# Patient Record
Sex: MALE | Race: WHITE | Employment: FULL TIME | URBAN - METROPOLITAN AREA
[De-identification: names, ages, dates, MRNs, and addresses within clinical notes are randomized per-mention and may not be internally consistent; named-entity substitution may affect disease eponyms.]

---

## 2020-06-09 ENCOUNTER — ORDER TRANSCRIPTION (OUTPATIENT)
Dept: PHYSICAL THERAPY | Age: 55
End: 2020-06-09

## 2020-06-09 DIAGNOSIS — M25.522 LEFT ELBOW PAIN: Primary | ICD-10-CM

## 2020-06-10 ENCOUNTER — ORDER TRANSCRIPTION (OUTPATIENT)
Dept: PHYSICAL THERAPY | Age: 55
End: 2020-06-10

## 2020-06-10 ENCOUNTER — OFFICE VISIT (OUTPATIENT)
Dept: PHYSICAL THERAPY | Age: 55
End: 2020-06-10
Attending: FAMILY MEDICINE
Payer: OTHER MISCELLANEOUS

## 2020-06-10 DIAGNOSIS — M25.522 LEFT ELBOW PAIN: Primary | ICD-10-CM

## 2020-06-10 DIAGNOSIS — M25.522 LEFT ELBOW PAIN: ICD-10-CM

## 2020-06-10 PROCEDURE — 97110 THERAPEUTIC EXERCISES: CPT | Performed by: PHYSICAL THERAPIST

## 2020-06-10 PROCEDURE — 97162 PT EVAL MOD COMPLEX 30 MIN: CPT | Performed by: PHYSICAL THERAPIST

## 2020-06-10 NOTE — PROGRESS NOTES
UPPER EXTREMITY EVALUATION:   Referring Physician: Dr. Noel Holcomb  Diagnosis:  Left elbow pain (M25.522)     Date of Onset: 5/26/2020 Date of Service: 6/10/2020     PATIENT SUMMARY   Rosendo Luz is a 50year old y/o male who presents to therapy today with movements; avoidance or weight bearing on the (L) UE; (+) swelling around jointline; (-) bruising. Cervical Screen:  Limited CROM toward (R/L) rotation, sidebending, and extension with endrange ache/pain.      ROM/MMT:  Elbow   Flexion:  (L) 108 degs*/ 3 Neuromuscular Re-education; Electrical Stim; Patient education; Home exercise program instruction.     Education or treatment limitation: None  Rehab Potential: good    This evaluation involved Moderate Complexity decision making due to 1-2 personal factors

## 2020-06-11 ENCOUNTER — OFFICE VISIT (OUTPATIENT)
Dept: PHYSICAL THERAPY | Age: 55
End: 2020-06-11
Attending: FAMILY MEDICINE
Payer: OTHER MISCELLANEOUS

## 2020-06-11 PROCEDURE — 97110 THERAPEUTIC EXERCISES: CPT | Performed by: PHYSICAL THERAPIST

## 2020-06-11 NOTE — PROGRESS NOTES
Date: 6/11/2020     Dx:  Left elbow pain (M25.522)         Authorized # of Visits:  12       Referring MD: Afsaneh Crowder MD  Next MD visit: 3-6 weeks     Medication Changes since last visit?: No    Subjective:  Laura Bonilla report that his (L) elbow is still sore an elbow AROM in all directions with 4-5/5 MMT in all motions to promote all daily work, home, and recreational activities without discomfort or deviation.             Plan:   Re-assess next session and continue with directional preference exercise, eccentric

## 2020-06-18 ENCOUNTER — OFFICE VISIT (OUTPATIENT)
Dept: PHYSICAL THERAPY | Age: 55
End: 2020-06-18
Attending: FAMILY MEDICINE
Payer: OTHER MISCELLANEOUS

## 2020-06-18 PROCEDURE — 97110 THERAPEUTIC EXERCISES: CPT | Performed by: PHYSICAL THERAPIST

## 2020-06-18 NOTE — PROGRESS NOTES
Date: 6/11/2020     Dx:  Left elbow pain (M25.522)         Authorized # of Visits:  12       Referring MD: Lara Everett MD  Next MD visit: 3-6 weeks     Medication Changes since last visit?: No    Subjective:  Nathanael Bun report that his (L) elbow is still sore an abolished symptom in the (L) elbow to allow dressing up, reaching, lifting, carrying, driving, turning a knob to open a door, placing weight on the arm without producing or increasing symptoms in the (L) elbow and upper arm area.     3. Patient to have WNL

## 2020-06-19 ENCOUNTER — OFFICE VISIT (OUTPATIENT)
Dept: PHYSICAL THERAPY | Age: 55
End: 2020-06-19
Attending: FAMILY MEDICINE
Payer: OTHER MISCELLANEOUS

## 2020-06-19 PROCEDURE — 97110 THERAPEUTIC EXERCISES: CPT | Performed by: PHYSICAL THERAPIST

## 2020-06-19 NOTE — PROGRESS NOTES
Date: 6/19/2020     Dx:  Left elbow pain (M25.522)         Authorized # of Visits:  12       Referring MD: Oanh Wade., MD  Next MD visit: 3-6 weeks     Medication Changes since last visit?: No    Subjective:  Oliverio report that his (L) elbow is still sore fr 2 x 20 reps    Repeated (L) elbow extension with lateral OP x 10 reps             Assessment:   Patricia Escobar is responding to extension based directional preference exercise with a (L) lateral force.   Continued with eccentric strengthening with some production of

## 2020-06-24 ENCOUNTER — OFFICE VISIT (OUTPATIENT)
Dept: PHYSICAL THERAPY | Age: 55
End: 2020-06-24
Attending: FAMILY MEDICINE
Payer: OTHER MISCELLANEOUS

## 2020-06-24 PROCEDURE — 97110 THERAPEUTIC EXERCISES: CPT | Performed by: PHYSICAL THERAPIST

## 2020-06-24 NOTE — PROGRESS NOTES
Date: 6/24/2020     Dx:  Left elbow pain (M25.522)         Authorized # of Visits:  12       Referring MD: Nick Dial., MD  Next MD visit: 3-6 weeks     Medication Changes since last visit?: No    Subjective: Oliverio report that his (L) elbow is still sore. mins ea fwd/bkwd    Test motion: (L) elbow flexion; pain/tenderness medial elbow    Repeated (L) elbow flexion with supination x 10 reps; P, NW (no change)    Repeated (L) elbow flexion with pronation 2 x 10 reps; P, NW (better)     - no change     + self

## 2020-07-01 ENCOUNTER — TELEPHONE (OUTPATIENT)
Dept: PHYSICAL THERAPY | Age: 55
End: 2020-07-01

## 2020-07-02 ENCOUNTER — APPOINTMENT (OUTPATIENT)
Dept: PHYSICAL THERAPY | Age: 55
End: 2020-07-02
Attending: FAMILY MEDICINE
Payer: OTHER MISCELLANEOUS

## 2020-07-03 ENCOUNTER — OFFICE VISIT (OUTPATIENT)
Dept: PHYSICAL THERAPY | Age: 55
End: 2020-07-03
Attending: FAMILY MEDICINE
Payer: OTHER MISCELLANEOUS

## 2020-07-03 PROCEDURE — 97110 THERAPEUTIC EXERCISES: CPT | Performed by: PHYSICAL THERAPIST

## 2020-07-03 NOTE — PROGRESS NOTES
Date: 7/3/2020     Dx:  Left elbow pain (M25.522)         Authorized # of Visits:  12       Referring MD: Nafisa Hollingsworth., MD  Next MD visit: 3-6 weeks     Medication Changes since last visit?: No    Subjective:  Oliverio report that his (L) elbow is feeling better (L) tricep press blueTB 2 x 20 reps    (L) elbow flexion 10 lbs 2 x 20 reps    Repeated (L) elbow extension with lateral OP x 10 reps   Scifit UE only L4 x 3 mins ea fwd/bkwd    Test motion: (L) elbow flexion; pain/tenderness medial elbow    Repeated (L) e without producing or increasing symptoms in the (L) elbow and upper arm area.     3. Patient to have WNL of (L) elbow AROM in all directions with 4-5/5 MMT in all motions to promote all daily work, home, and recreational activities without discomfort or dev

## 2020-07-08 ENCOUNTER — OFFICE VISIT (OUTPATIENT)
Dept: PHYSICAL THERAPY | Age: 55
End: 2020-07-08
Attending: FAMILY MEDICINE
Payer: OTHER MISCELLANEOUS

## 2020-07-08 PROCEDURE — 97110 THERAPEUTIC EXERCISES: CPT | Performed by: PHYSICAL THERAPIST

## 2020-07-08 NOTE — PROGRESS NOTES
Date: 7/8/2020     Dx:  Left elbow pain (M25.522)         Authorized # of Visits:  12       Referring MD: Nick Dial., MD  Next MD visit: 3-6 weeks     Medication Changes since last visit?: No    Subjective:  Oliverio report that his (L) elbow is feeling better reps    (L) elbow flexion 10 lbs 2 x 20 reps    Repeated (L) elbow extension with lateral OP x 10 reps   Scifit UE only L4 x 3 mins ea fwd/bkwd    Test motion: (L) elbow flexion; pain/tenderness medial elbow    Repeated (L) elbow flexion with supination x a hammer/shana at home. Goals:   Goals     • Therapy Goals       (12 visits)  1. Patient to consistently perform their HEP and it's progression to maintain their improved condition.   2. Patient to have reduced an abolished symptom in the (L) elbow to a

## 2020-07-09 ENCOUNTER — APPOINTMENT (OUTPATIENT)
Dept: PHYSICAL THERAPY | Age: 55
End: 2020-07-09
Attending: FAMILY MEDICINE
Payer: OTHER MISCELLANEOUS

## 2020-07-16 ENCOUNTER — OFFICE VISIT (OUTPATIENT)
Dept: PHYSICAL THERAPY | Age: 55
End: 2020-07-16
Attending: FAMILY MEDICINE
Payer: OTHER MISCELLANEOUS

## 2020-07-16 PROCEDURE — 97110 THERAPEUTIC EXERCISES: CPT | Performed by: PHYSICAL THERAPIST

## 2020-07-16 NOTE — PROGRESS NOTES
Date: 7/16/2020     Dx:  Left elbow pain (M25.522)         Authorized # of Visits:  12       Referring MD: Ronn Lawrence MD  Next MD visit: 3-6 weeks     Medication Changes since last visit?: No    Subjective:  Kong Ghotra report that his (L) elbow hurt over the we lbs 2 x 10 reps x 10 eccen ct ea    OH (L) tricep press blueTB 2 x 20 reps    (L) elbow flexion 10 lbs 2 x 20 reps    Repeated (L) elbow extension with lateral OP x 10 reps   Scifit UE only L4 x 3 mins ea fwd/bkwd    Test motion: (L) elbow flexion; pain/te (better)     - less pain test motion 1/10    (L) Elbow in flexion eccentric supination with a cane with 3 lbs 10 reps x 10 eccen ct      - no change test motion    (L) Elbow supination with extension 2 x 10 reps; P, NW     - still 1/10 with more flexion mo

## 2020-07-17 ENCOUNTER — APPOINTMENT (OUTPATIENT)
Dept: PHYSICAL THERAPY | Age: 55
End: 2020-07-17
Attending: FAMILY MEDICINE
Payer: OTHER MISCELLANEOUS

## 2020-07-22 ENCOUNTER — OFFICE VISIT (OUTPATIENT)
Dept: PHYSICAL THERAPY | Age: 55
End: 2020-07-22
Attending: FAMILY MEDICINE
Payer: OTHER MISCELLANEOUS

## 2020-07-22 PROCEDURE — 97110 THERAPEUTIC EXERCISES: CPT | Performed by: PHYSICAL THERAPIST

## 2020-07-22 NOTE — PROGRESS NOTES
Date: 7/22/2020     Dx:  Left elbow pain (M25.522)         Authorized # of Visits:  12       Referring MD: Alexis Rose., MD  Next MD visit: 3-6 weeks     Medication Changes since last visit?: No    Subjective:  Caprice Iverson report that his (L) elbow feels better john reps x 10 eccen ct ea    OH (L) tricep press blueTB 2 x 20 reps    (L) elbow flexion 10 lbs 2 x 20 reps    Repeated (L) elbow extension with lateral OP x 10 reps   Scifit UE only L4 x 3 mins ea fwd/bkwd    Test motion: (L) elbow flexion; pain/tenderness me less pain test motion 1/10    (L) Elbow in flexion eccentric supination with a cane with 3 lbs 10 reps x 10 eccen ct      - no change test motion    (L) Elbow supination with extension 2 x 10 reps; P, NW     - still 1/10 with more flexion motion     + 10 m time.    Goals: - IN PROGRESS  Goals     • Therapy Goals       (12 visits)  1. Patient to consistently perform their HEP and it's progression to maintain their improved condition.   2. Patient to have reduced an abolished symptom in the (L) elbow to allow d

## 2020-07-23 ENCOUNTER — OFFICE VISIT (OUTPATIENT)
Dept: PHYSICAL THERAPY | Age: 55
End: 2020-07-23
Attending: FAMILY MEDICINE
Payer: OTHER MISCELLANEOUS

## 2020-07-23 PROCEDURE — 97110 THERAPEUTIC EXERCISES: CPT | Performed by: PHYSICAL THERAPIST

## 2020-07-23 NOTE — PROGRESS NOTES
Date: 7/23/2020     Dx:  Left elbow pain (M25.522)         Authorized # of Visits:  12       Referring MD: Ekta Henning., MD  Next MD visit: 2-3 weeks from (7/23/2020)     Medication Changes since last visit?: No    Subjective:  Angela Santana report that his (L) elbow cane and 2 lbs 2 x 10 reps x 10 eccen ct ea    OH (L) tricep press blueTB 2 x 20 reps    (L) elbow flexion 10 lbs 2 x 20 reps    Repeated (L) elbow extension with lateral OP x 10 reps   Scifit UE only L4 x 3 mins ea fwd/bkwd    Test motion: (L) elbow flexi P, NW (better)     - less pain test motion 1/10    (L) Elbow in flexion eccentric supination with a cane with 3 lbs 10 reps x 10 eccen ct      - no change test motion    (L) Elbow supination with extension 2 x 10 reps; P, NW     - still 1/10 with more flex remaining ache away in the (L) medial elbow. Progressed (L) UE stability and strengthening exercises with minimal ache produced during exercise but no worse as a result. He is well motivated, focused, and compliant with his HEP and it's progression.   He

## 2020-07-28 ENCOUNTER — ORDER TRANSCRIPTION (OUTPATIENT)
Dept: PHYSICAL THERAPY | Age: 55
End: 2020-07-28

## 2020-07-28 DIAGNOSIS — S53.402S ELBOW SPRAIN, LEFT, SEQUELA: Primary | ICD-10-CM

## 2020-07-30 ENCOUNTER — TELEPHONE (OUTPATIENT)
Dept: PHYSICAL THERAPY | Age: 55
End: 2020-07-30

## 2020-07-30 ENCOUNTER — OFFICE VISIT (OUTPATIENT)
Dept: PHYSICAL THERAPY | Age: 55
End: 2020-07-30
Attending: FAMILY MEDICINE
Payer: OTHER MISCELLANEOUS

## 2020-07-30 PROCEDURE — 97110 THERAPEUTIC EXERCISES: CPT | Performed by: PHYSICAL THERAPIST

## 2020-07-30 NOTE — PROGRESS NOTES
Date: 7/30/2020     Dx:  Left elbow pain (M25.522)         Authorized # of Visits:  12       Referring MD: Dianne Brittle., MD  Next MD visit: 2-3 weeks from (7/23/2020)     Medication Changes since last visit?: No    Subjective:  Andrae Lee report that his (L) elbow reps    (L) elbow flexion 10 lbs 2 x 20 reps    Repeated (L) elbow extension with lateral OP x 10 reps   Scifit UE only L4 x 3 mins ea fwd/bkwd    Test motion: (L) elbow flexion; pain/tenderness medial elbow    Repeated (L) elbow flexion with supination x supination with a cane with 3 lbs 10 reps x 10 eccen ct      - no change test motion    (L) Elbow supination with extension 2 x 10 reps; P, NW     - still 1/10 with more flexion motion     + 10 more (L) elbow supination with extension; P, NW (better)     - eccentric supination with cane and 3 lbs 2 x 10 reps x 10 eccen ct ea; NE     - no change test motion    (L) wrist roller with 3 lbs forward/backward roll x 10 reps ea; NE tired arm     - no change test motion    (B) UE OH wall walk with blueTB abd resist

## 2020-07-31 ENCOUNTER — APPOINTMENT (OUTPATIENT)
Dept: PHYSICAL THERAPY | Age: 55
End: 2020-07-31
Attending: FAMILY MEDICINE
Payer: OTHER MISCELLANEOUS

## 2020-08-13 ENCOUNTER — OFFICE VISIT (OUTPATIENT)
Dept: PHYSICAL THERAPY | Age: 55
End: 2020-08-13
Attending: FAMILY MEDICINE
Payer: OTHER MISCELLANEOUS

## 2020-08-13 PROCEDURE — 97110 THERAPEUTIC EXERCISES: CPT | Performed by: PHYSICAL THERAPIST

## 2020-08-13 NOTE — PROGRESS NOTES
Date: 8/13/2020     Dx:  Left elbow pain (M25.522)         Authorized # of Visits:  12       Referring MD: Ekta Henning., MD  Next MD visit: 2-3 weeks from (7/23/2020)     Medication Changes since last visit?: No    Subjective:  Angela Santana report that his (L) elbow mins ea fwd/bkwd    Test motion: (L) elbow flexion; pain/tenderness medial elbow    Repeated (L) elbow flexion with supination x 10 reps; P, NW (no change)    Repeated (L) elbow flexion with pronation 2 x 10 reps; P, NW (better)     - no change     + self 10 reps; P, NW     - still 1/10 with more flexion motion     + 10 more (L) elbow supination with extension; P, NW (better)     - no change in pain but feels easier to bend the elbow            Date: 7/22/2020  Tx#: 9/12 Date: 7/23/2020  Tx#: 10/12 Date: 7/ motion    (L) wrist roller with 3 lbs forward/backward roll x 10 reps ea; NE tired arm     - no change test motion    (B) UE OH wall walk with blueTB abd resist 10 reps x 10 cts ea; NE tired   Scifit UE only L9 x 5 mins ea fwd/bkwd     Ballistic (L) elbow all daily work, home, and recreational activities without discomfort or deviation. Plan:   Continue with 18 more PT sessions to progress through directional preference exercise, eccentric strengthening, stability exercises, and patient education.

## 2020-08-14 ENCOUNTER — OFFICE VISIT (OUTPATIENT)
Dept: PHYSICAL THERAPY | Age: 55
End: 2020-08-14
Attending: FAMILY MEDICINE
Payer: OTHER MISCELLANEOUS

## 2020-08-14 PROCEDURE — 97110 THERAPEUTIC EXERCISES: CPT | Performed by: PHYSICAL THERAPIST

## 2020-08-14 NOTE — PROGRESS NOTES
Date: 8/14/2020     Dx:  Left elbow pain (M25.522)         Authorized # of Visits:  12 + 18 = 30     Referring MD: Fabiano Malagon MD  Next MD visit: 2-3 weeks from (7/23/2020)     Medication Changes since last visit?: No    Subjective:  Heidy Souza report that his ( elbow flexion; pain/tenderness medial elbow    Repeated (L) elbow flexion with supination x 10 reps; P, NW (no change)    Repeated (L) elbow flexion with pronation 2 x 10 reps; P, NW (better)     - no change     + self OP 2 x 10 reps; P, NW (better)     + more flexion motion     + 10 more (L) elbow supination with extension; P, NW (better)     - no change in pain but feels easier to bend the elbow            Date: 7/22/2020  Tx#: 9/12 Date: 7/23/2020  Tx#: 10/12 Date: 7/30/2020  Tx#: 11/12 Date: 8/12/2020 wrist roller with 3 lbs forward/backward roll x 10 reps ea; NE tired arm     - no change test motion    (B) UE OH wall walk with blueTB abd resist 10 reps x 10 cts ea; NE tired   Scifit UE only L9 x 5 mins ea fwd/bkwd     Ballistic (L) elbow extension with reduced an abolished symptom in the (L) elbow to allow dressing up, reaching, lifting, carrying, driving, turning a knob to open a door, placing weight on the arm without producing or increasing symptoms in the (L) elbow and upper arm area.     3. Patient t

## 2020-08-20 ENCOUNTER — TELEPHONE (OUTPATIENT)
Dept: PHYSICAL THERAPY | Age: 55
End: 2020-08-20

## 2020-08-27 ENCOUNTER — APPOINTMENT (OUTPATIENT)
Dept: PHYSICAL THERAPY | Age: 55
End: 2020-08-27
Attending: FAMILY MEDICINE
Payer: OTHER MISCELLANEOUS

## 2020-08-28 ENCOUNTER — OFFICE VISIT (OUTPATIENT)
Dept: PHYSICAL THERAPY | Age: 55
End: 2020-08-28
Attending: FAMILY MEDICINE
Payer: OTHER MISCELLANEOUS

## 2020-08-28 PROCEDURE — 97110 THERAPEUTIC EXERCISES: CPT | Performed by: PHYSICAL THERAPIST

## 2020-08-28 NOTE — PROGRESS NOTES
Date: 8/14/2020     Dx:  Left elbow pain (M25.522)         Authorized # of Visits:  12 + 12 = 24    Referring MD: Nick Dial., MD  Next MD visit: 2-3 weeks from (7/23/2020)     Medication Changes since last visit?: No    Subjective:  Twin Boo report that his (L elbow flexion; pain/tenderness medial elbow    Repeated (L) elbow flexion with supination x 10 reps; P, NW (no change)    Repeated (L) elbow flexion with pronation 2 x 10 reps; P, NW (better)     - no change     + self OP 2 x 10 reps; P, NW (better)     + more flexion motion     + 10 more (L) elbow supination with extension; P, NW (better)     - no change in pain but feels easier to bend the elbow            Date: 7/22/2020  Tx#: 9/12 Date: 7/23/2020  Tx#: 10/12 Date: 7/30/2020  Tx#: 11/12 Date: 8/12/2020 wrist roller with 3 lbs forward/backward roll x 10 reps ea; NE tired arm     - no change test motion    (B) UE OH wall walk with blueTB abd resist 10 reps x 10 cts ea; NE tired   Scifit UE only L9 x 5 mins ea fwd/bkwd     Ballistic (L) elbow extension with kg ball catch/throw onto a rebounder x 25 reps; NE tired arm          Assessment: Kasey Huber did not feel any pain today but there was still an occasional \"cracking\"/\"popping\" in the (L) elbow joint.   He is slowly progressing with (L) UE strengthening and st

## 2020-09-02 ENCOUNTER — OFFICE VISIT (OUTPATIENT)
Dept: PHYSICAL THERAPY | Age: 55
End: 2020-09-02
Attending: FAMILY MEDICINE
Payer: OTHER MISCELLANEOUS

## 2020-09-02 PROCEDURE — 97110 THERAPEUTIC EXERCISES: CPT | Performed by: PHYSICAL THERAPIST

## 2020-09-02 NOTE — PROGRESS NOTES
Date: 8/14/2020     Dx:  Left elbow pain (M25.522)         Authorized # of Visits:  12 + 12 = 24    Referring MD: Anam Patel MD  Next MD visit: 2-3 weeks from (7/23/2020)     Medication Changes since last visit?: No    Subjective:  Florence Toscano report that his (L elbow flexion; pain/tenderness medial elbow    Repeated (L) elbow flexion with supination x 10 reps; P, NW (no change)    Repeated (L) elbow flexion with pronation 2 x 10 reps; P, NW (better)     - no change     + self OP 2 x 10 reps; P, NW (better)     + more flexion motion     + 10 more (L) elbow supination with extension; P, NW (better)     - no change in pain but feels easier to bend the elbow            Date: 7/22/2020  Tx#: 9/12 Date: 7/23/2020  Tx#: 10/12 Date: 7/30/2020  Tx#: 11/12 Date: 8/12/2020 wrist roller with 3 lbs forward/backward roll x 10 reps ea; NE tired arm     - no change test motion    (B) UE OH wall walk with blueTB abd resist 10 reps x 10 cts ea; NE tired   Scifit UE only L9 x 5 mins ea fwd/bkwd     Ballistic (L) elbow extension with x 10 reps ea    (L) UE 1 kg ball catch/throw onto a rebounder x 25 reps; NE tired arm Scifit UE only L9 x 5 mins ea fwd/bkwd     Ballistic (L) elbow extension with supination x 10 reps; NE    Seated: (L) elbow flexion concentric/eccentric with  double maxim

## 2020-09-10 ENCOUNTER — TELEPHONE (OUTPATIENT)
Dept: PHYSICAL THERAPY | Age: 55
End: 2020-09-10

## 2020-09-10 ENCOUNTER — APPOINTMENT (OUTPATIENT)
Dept: PHYSICAL THERAPY | Age: 55
End: 2020-09-10
Attending: FAMILY MEDICINE
Payer: OTHER MISCELLANEOUS

## 2020-09-10 NOTE — TELEPHONE ENCOUNTER
Patient did not show up for his appointment at 5:30pm.  Contacted patient and he says that he forgot he is still at work.   Reminded him of his next appointment tomorrow at 7 am.

## 2020-09-11 ENCOUNTER — OFFICE VISIT (OUTPATIENT)
Dept: PHYSICAL THERAPY | Age: 55
End: 2020-09-11
Attending: FAMILY MEDICINE
Payer: OTHER MISCELLANEOUS

## 2020-09-11 PROCEDURE — 97110 THERAPEUTIC EXERCISES: CPT | Performed by: PHYSICAL THERAPIST

## 2020-09-11 NOTE — PROGRESS NOTES
Date: 9/11/2020     Dx:  Left elbow pain (M25.522)         Authorized # of Visits:  12 + 12 = 24    Referring MD: Oanh Wade., MD  Next MD visit: 2-3 weeks from (7/23/2020)     Medication Changes since last visit?: No    Subjective:  Lennox Beady report that he eryn reps    Repeated (L) elbow extension with lateral OP x 10 reps   Scifit UE only L4 x 3 mins ea fwd/bkwd    Test motion: (L) elbow flexion; pain/tenderness medial elbow    Repeated (L) elbow flexion with supination x 10 reps; P, NW (no change)    Repeated ( reps x 10 eccen ct      - no change test motion    (L) Elbow supination with extension 2 x 10 reps; P, NW     - still 1/10 with more flexion motion     + 10 more (L) elbow supination with extension; P, NW (better)     - no change in pain but feels easier t rep    (L) Elbow eccentric supination with cane and 3 lbs 2 x 10 reps x 10 eccen ct ea; NE     - no change test motion    (L) wrist roller with 3 lbs forward/backward roll x 10 reps ea; NE tired arm     - no change test motion    (B) UE OH wall walk with b 10 reps x 10 eccen ct ea; NE    (B) UE functional bicep curl straight/diagonal with 10 lbs 2 sets x 20 reps ea; NE     (B) UE ER @ 90/90 blueTB 10 sets x 10 reps ea    (L) UE 1 kg ball catch/throw onto a rebounder x 25 reps; NE tired arm Scifit UE only L9 to promote all daily work, home, and recreational activities without discomfort or deviation. Plan:   Continue with directional preference exercise, eccentric strengthening, stability exercises, and patient education. Charges:  TherEx x 3

## 2020-09-16 ENCOUNTER — OFFICE VISIT (OUTPATIENT)
Dept: PHYSICAL THERAPY | Age: 55
End: 2020-09-16
Attending: FAMILY MEDICINE
Payer: OTHER MISCELLANEOUS

## 2020-09-16 PROCEDURE — 97110 THERAPEUTIC EXERCISES: CPT | Performed by: PHYSICAL THERAPIST

## 2020-09-16 NOTE — PROGRESS NOTES
Date: 9/16/2020     Dx:  Left elbow pain (M25.522)         Authorized # of Visits:  12 + 12 = 24    Referring MD: Oanh Wade., MD  Next MD visit: 2-3 weeks from (7/23/2020)     Medication Changes since last visit?: No    Subjective:  Lennox Beady report that he sti reps    (L) elbow flexion 10 lbs 2 x 20 reps    Repeated (L) elbow extension with lateral OP x 10 reps   Scifit UE only L4 x 3 mins ea fwd/bkwd    Test motion: (L) elbow flexion; pain/tenderness medial elbow    Repeated (L) elbow flexion with supination x supination with a cane with 3 lbs 10 reps x 10 eccen ct      - no change test motion    (L) Elbow supination with extension 2 x 10 reps; P, NW     - still 1/10 with more flexion motion     + 10 more (L) elbow supination with extension; P, NW (better)     - sets x 5 reps x 10 eccen ct on last rep    (L) Elbow eccentric supination with cane and 3 lbs 2 x 10 reps x 10 eccen ct ea; NE     - no change test motion    (L) wrist roller with 3 lbs forward/backward roll x 10 reps ea; NE tired arm     - no change test ea    (L) Elbow eccentric + rhytmic hold supination with cane and 3 lbs 10 reps x 10 eccen ct ea; NE    (B) UE functional bicep curl straight/diagonal with 10 lbs 2 sets x 20 reps ea; NE     (B) UE ER @ 90/90 blueTB 10 sets x 10 reps ea    (L) UE 1 kg ball elbow pain only muscles working. Continue to have WNL of (L) elbow AROM in all motions after his session. Goals: - PARTIALLY MET  Goals     • Therapy Goals       (12 visits)  1.  Patient to consistently perform their HEP and it's progression to [facility]

## 2020-09-25 ENCOUNTER — OFFICE VISIT (OUTPATIENT)
Dept: PHYSICAL THERAPY | Age: 55
End: 2020-09-25
Attending: FAMILY MEDICINE
Payer: OTHER MISCELLANEOUS

## 2020-09-25 PROCEDURE — 97110 THERAPEUTIC EXERCISES: CPT | Performed by: PHYSICAL THERAPIST

## 2020-09-25 NOTE — PROGRESS NOTES
Date: 9/25/2020     Dx:  Left elbow pain (M25.522)         Authorized # of Visits:  12 + 12 = 24    Referring MD: Nick Hale, MD  Next MD visit: 2-3 weeks from (7/23/2020)     Medication Changes since last visit?: No    Subjective:  Cathy Lexistuart report that he get supination with cane and 2 lbs 2 x 10 reps x 10 eccen ct ea    OH (L) tricep press blueTB 2 x 20 reps    (L) elbow flexion 10 lbs 2 x 20 reps    Repeated (L) elbow extension with lateral OP x 10 reps   Scifit UE only L4 x 3 mins ea fwd/bkwd    Test motion: OP 4 x 10 reps; P, NW (better)     - less pain test motion 1/10    (L) Elbow in flexion eccentric supination with a cane with 3 lbs 10 reps x 10 eccen ct      - no change test motion    (L) Elbow supination with extension 2 x 10 reps; P, NW     - still 1/1 - less than 1/10 ache test motion    (L) Elbow flexion concentric/eccentric with double black 10 sets x 5 reps x 10 eccen ct on last rep    (L) Elbow eccentric supination with cane and 3 lbs 2 x 10 reps x 10 eccen ct ea; NE     - no change test motion    ( x 10 reps; NE    Seated: (L) elbow flexion concentric/eccentric with  double blackTB 10 sets x 5 reps x 10 eccen ct ea    (L) Elbow eccentric + rhytmic hold supination with cane and 3 lbs 10 reps x 10 eccen ct ea; NE    (B) UE functional bicep curl straigh supination x 10 reps; NE Scifit UE only L10 x 5 mins ea fwd/bkwd      Seated: (L) elbow flexion concentric/eccentric with  double blackTB 10 sets x 10 reps x 10 eccen ct ea    Standing: (L) elbow extension concentric/eccentric load with double blackTB 5 se

## 2020-09-30 ENCOUNTER — TELEPHONE (OUTPATIENT)
Dept: PHYSICAL THERAPY | Age: 55
End: 2020-09-30

## 2020-09-30 ENCOUNTER — APPOINTMENT (OUTPATIENT)
Dept: PHYSICAL THERAPY | Age: 55
End: 2020-09-30
Attending: FAMILY MEDICINE
Payer: OTHER MISCELLANEOUS

## 2020-09-30 NOTE — TELEPHONE ENCOUNTER
Sabrina Davis communicated that he won't make it to his appointment today because he is waiting on his ortho MD to discuss possible treatments for his (L) elbow.  He will let therapist know what he discussed with the MD.

## 2020-10-02 ENCOUNTER — APPOINTMENT (OUTPATIENT)
Dept: PHYSICAL THERAPY | Age: 55
End: 2020-10-02
Attending: FAMILY MEDICINE
Payer: OTHER MISCELLANEOUS

## 2020-10-02 ENCOUNTER — TELEPHONE (OUTPATIENT)
Dept: PHYSICAL THERAPY | Age: 55
End: 2020-10-02

## 2020-10-09 ENCOUNTER — OFFICE VISIT (OUTPATIENT)
Dept: PHYSICAL THERAPY | Age: 55
End: 2020-10-09
Attending: FAMILY MEDICINE
Payer: OTHER MISCELLANEOUS

## 2020-10-09 PROCEDURE — 97110 THERAPEUTIC EXERCISES: CPT | Performed by: PHYSICAL THERAPIST

## 2020-10-09 NOTE — PROGRESS NOTES
Date: 10/9/2020     Dx:  Left elbow pain (M25.522)         Authorized # of Visits:  12 + 12 = 24    Referring MD: Naomy Love MD  Next MD visit: 2-3 weeks from (7/23/2020)     Medication Changes since last visit?: No    Subjective:  Evelin Arias report that he tamar supination with cane and 2 lbs 2 x 10 reps x 10 eccen ct ea    OH (L) tricep press blueTB 2 x 20 reps    (L) elbow flexion 10 lbs 2 x 20 reps    Repeated (L) elbow extension with lateral OP x 10 reps   Scifit UE only L4 x 3 mins ea fwd/bkwd    Test motion: OP 4 x 10 reps; P, NW (better)     - less pain test motion 1/10    (L) Elbow in flexion eccentric supination with a cane with 3 lbs 10 reps x 10 eccen ct      - no change test motion    (L) Elbow supination with extension 2 x 10 reps; P, NW     - still 1/1 - less than 1/10 ache test motion    (L) Elbow flexion concentric/eccentric with double black 10 sets x 5 reps x 10 eccen ct on last rep    (L) Elbow eccentric supination with cane and 3 lbs 2 x 10 reps x 10 eccen ct ea; NE     - no change test motion    ( extension with supination x 10 reps; NE    Seated: (L) elbow flexion concentric/eccentric with  double blackTB 10 sets x 5 reps x 10 eccen ct ea    (L) Elbow eccentric + rhytmic hold supination with cane and 3 lbs 10 reps x 10 eccen ct ea; NE    (B) UE fun NE    Ballistic (L) elbow extension with supination x 10 reps; NE Scifit UE only L10 x 5 mins ea fwd/bkwd      Seated: (L) elbow flexion concentric/eccentric with  double blackTB 10 sets x 10 reps x 10 eccen ct ea    Standing: (L) elbow extension concentri exercises, and patient education. Charges:  TherEx x 3       Total Timed Treatment: 42 mins Total Treatment Time: 43 mins

## 2020-11-11 ENCOUNTER — TELEPHONE (OUTPATIENT)
Dept: PHYSICAL THERAPY | Age: 55
End: 2020-11-11

## 2020-11-12 ENCOUNTER — TELEPHONE (OUTPATIENT)
Dept: PHYSICAL THERAPY | Age: 55
End: 2020-11-12

## 2020-11-12 ENCOUNTER — APPOINTMENT (OUTPATIENT)
Dept: PHYSICAL THERAPY | Age: 55
End: 2020-11-12
Attending: ORTHOPAEDIC SURGERY
Payer: OTHER MISCELLANEOUS

## 2020-11-12 ENCOUNTER — APPOINTMENT (OUTPATIENT)
Dept: PHYSICAL THERAPY | Age: 55
End: 2020-11-12
Attending: ORTHOPAEDIC SURGERY

## 2020-11-12 NOTE — TELEPHONE ENCOUNTER
Seymour Dandy did not show for his appointment today at 5:30 pm.  Called patient and said he forgot his appointment and he is still in [facility], he won't make it anymore. He was sorry for missing his appointment.   He was added for tomorrow (11/13/2020) at

## 2020-11-13 ENCOUNTER — TELEPHONE (OUTPATIENT)
Dept: PHYSICAL THERAPY | Age: 55
End: 2020-11-13

## 2020-11-13 ENCOUNTER — APPOINTMENT (OUTPATIENT)
Dept: PHYSICAL THERAPY | Age: 55
End: 2020-11-13
Attending: ORTHOPAEDIC SURGERY
Payer: OTHER MISCELLANEOUS

## 2020-11-13 NOTE — TELEPHONE ENCOUNTER
Oliverio communicated late to cancel his appointment at 10:45 am.  He said that his daughter was exposed to the Covid virus and is waiting for test result. He apologized for the late cancellation.
5

## 2020-12-04 ENCOUNTER — OFFICE VISIT (OUTPATIENT)
Dept: PHYSICAL THERAPY | Age: 55
End: 2020-12-04
Attending: ORTHOPAEDIC SURGERY
Payer: OTHER MISCELLANEOUS

## 2020-12-04 PROCEDURE — 97110 THERAPEUTIC EXERCISES: CPT | Performed by: PHYSICAL THERAPIST

## 2020-12-04 NOTE — PROGRESS NOTES
Date: 12/4/2020                                        Dx: Indy Escoto elbow pain (M25.522)                  Authorized # of Visits:  12 + 12 = 24     Referring MD: Janeen Hoover MD  Next MD visit: 2-3 weeks from (7/23/2020)      Medication Changes since last vis P, NW (better)     Seated: (L) elbow eccentric supination with cane and 2 lbs 2 x 10 reps x 10 eccen ct ea     OH (L) tricep press blueTB 2 x 20 reps     (L) elbow flexion 10 lbs 2 x 20 reps     Repeated (L) elbow extension with lateral OP x 10 reps    Sci with 10 lbs; 4/10 pain medial elbow     (L) Elbow repeated supination with self OP 4 x 10 reps; P, NW (better)      - less pain test motion 1/10     (L) Elbow in flexion eccentric supination with a cane with 3 lbs 10 reps x 10 eccen ct      - no change martínez NE   - less than 1/10 ache test motion     Standing: (L) elbow repeated extension with self OP x 10 reps; NE    - less than 1/10 ache test motion     (L) Elbow flexion concentric/eccentric with double black 10 sets x 5 reps x 10 eccen ct on last rep     (L 9/16/2020  Tx#: 17/24 Date: 9/25/2020  Tx#: 18/24 Date: 10/9/2020  Tx#: 19/24   Scifit UE only L9 x 5 mins ea fwd/bkwd     Ballistic (L) elbow extension with supination x 10 reps; NE     Seated: (L) elbow flexion concentric/eccentric with  double blackTB 1 straight/diagonal with 13 lbs 2 sets x 20 reps ea; NE      (L) Elbow eccentric + rhytmic hold supination with cane and 4 lbs 10 reps x 10 eccen ct ea; NE     Ballistic (L) elbow extension with supination x 10 reps; NE Scifit UE only L10 x 5 mins ea fwd/bkw HEP and it's progression to maintain their improved condition.   2. Patient to have reduced an abolished symptom in the (L) elbow to allow dressing up, reaching, lifting, carrying, driving, turning a knob to open a door, placing weight on the arm without pr

## 2020-12-09 ENCOUNTER — OFFICE VISIT (OUTPATIENT)
Dept: PHYSICAL THERAPY | Age: 55
End: 2020-12-09
Attending: ORTHOPAEDIC SURGERY
Payer: OTHER MISCELLANEOUS

## 2020-12-09 PROCEDURE — 97110 THERAPEUTIC EXERCISES: CPT | Performed by: PHYSICAL THERAPIST

## 2020-12-09 NOTE — PROGRESS NOTES
Date: 12/4/2020                                        Dx: Mayte Boor elbow pain (M25.522)                  Authorized # of Visits:  12 + 12 = 24     Referring MD: Ivan Marcos MD  Next MD visit: 2-3 weeks from (7/23/2020)      Medication Changes since last vis P, NW (better)     Seated: (L) elbow eccentric supination with cane and 2 lbs 2 x 10 reps x 10 eccen ct ea     OH (L) tricep press blueTB 2 x 20 reps     (L) elbow flexion 10 lbs 2 x 20 reps     Repeated (L) elbow extension with lateral OP x 10 reps    Sci with 10 lbs; 4/10 pain medial elbow     (L) Elbow repeated supination with self OP 4 x 10 reps; P, NW (better)      - less pain test motion 1/10     (L) Elbow in flexion eccentric supination with a cane with 3 lbs 10 reps x 10 eccen ct      - no change martínez NE   - less than 1/10 ache test motion     Standing: (L) elbow repeated extension with self OP x 10 reps; NE    - less than 1/10 ache test motion     (L) Elbow flexion concentric/eccentric with double black 10 sets x 5 reps x 10 eccen ct on last rep     (L 9/16/2020  Tx#: 17/24 Date: 9/25/2020  Tx#: 18/24 Date: 10/9/2020  Tx#: 19/24   Scifit UE only L9 x 5 mins ea fwd/bkwd     Ballistic (L) elbow extension with supination x 10 reps; NE     Seated: (L) elbow flexion concentric/eccentric with  double blackTB 1 straight/diagonal with 13 lbs 2 sets x 20 reps ea; NE      (L) Elbow eccentric + rhytmic hold supination with cane and 4 lbs 10 reps x 10 eccen ct ea; NE     Ballistic (L) elbow extension with supination x 10 reps; NE Scifit UE only L10 x 5 mins ea fwd/bkw reps x 10 eccen ct ea    (L) elbow hammer curl 10 lbs 5 x 20 reps        Assessment: Continued with (L) UE (shoulder, elbow, forearm) strengthening and stability exercises without pain but with point tenderness at medial (L) elbow.   He was tired/fatigued a

## 2020-12-18 ENCOUNTER — OFFICE VISIT (OUTPATIENT)
Dept: PHYSICAL THERAPY | Age: 55
End: 2020-12-18
Attending: ORTHOPAEDIC SURGERY
Payer: OTHER MISCELLANEOUS

## 2020-12-18 PROCEDURE — 97110 THERAPEUTIC EXERCISES: CPT | Performed by: PHYSICAL THERAPIST

## 2020-12-18 NOTE — PROGRESS NOTES
Date: 12/4/2020                                        Dx: Lor Acostasheldon elbow pain (M25.522)                  Authorized # of Visits:  12 + 12 = 24     Referring MD: Jeane Victoria., MD  Next MD visit: 2-3 weeks from (7/23/2020)      Medication Changes since last vis P, NW (better)     Seated: (L) elbow eccentric supination with cane and 2 lbs 2 x 10 reps x 10 eccen ct ea     OH (L) tricep press blueTB 2 x 20 reps     (L) elbow flexion 10 lbs 2 x 20 reps     Repeated (L) elbow extension with lateral OP x 10 reps    Sci with 10 lbs; 4/10 pain medial elbow     (L) Elbow repeated supination with self OP 4 x 10 reps; P, NW (better)      - less pain test motion 1/10     (L) Elbow in flexion eccentric supination with a cane with 3 lbs 10 reps x 10 eccen ct      - no change martínez NE   - less than 1/10 ache test motion     Standing: (L) elbow repeated extension with self OP x 10 reps; NE    - less than 1/10 ache test motion     (L) Elbow flexion concentric/eccentric with double black 10 sets x 5 reps x 10 eccen ct on last rep     (L 9/16/2020  Tx#: 17/24 Date: 9/25/2020  Tx#: 18/24 Date: 10/9/2020  Tx#: 19/24   Scifit UE only L9 x 5 mins ea fwd/bkwd     Ballistic (L) elbow extension with supination x 10 reps; NE     Seated: (L) elbow flexion concentric/eccentric with  double blackTB 1 straight/diagonal with 13 lbs 2 sets x 20 reps ea; NE      (L) Elbow eccentric + rhytmic hold supination with cane and 4 lbs 10 reps x 10 eccen ct ea; NE     Ballistic (L) elbow extension with supination x 10 reps; NE Scifit UE only L10 x 5 mins ea fwd/bkw (wrist extension) 3 x 10 reps x 10 eccen ct ea    (L) elbow hammer curl 10 lbs 5 x 20 reps Scifit UE only L10 x 5 mins ea fwd/bkwd    Standing: (L) elbow flexion concentric/eccentric with  double blackTB 10 sets x 10 reps x 10 eccen ct ea    Standing: (L)

## 2020-12-23 ENCOUNTER — OFFICE VISIT (OUTPATIENT)
Dept: PHYSICAL THERAPY | Age: 55
End: 2020-12-23
Attending: ORTHOPAEDIC SURGERY
Payer: OTHER MISCELLANEOUS

## 2020-12-23 PROCEDURE — 97110 THERAPEUTIC EXERCISES: CPT | Performed by: PHYSICAL THERAPIST

## 2020-12-23 NOTE — PROGRESS NOTES
Date: 12/23/2020                                        Dx: Magaly Woodbury elbow pain (M25.522)                  Authorized # of Visits:  12 + 12 = 24     Referring MD: Oanh Wade., MD  Next MD visit: 2-3 weeks from (7/23/2020)      Medication Changes since last vi 20 reps     (L) elbow flexion 10 lbs 2 x 20 reps     Repeated (L) elbow extension with lateral OP x 10 reps    Scifit UE only L4 x 3 mins ea fwd/bkwd     Test motion: (L) elbow flexion; pain/tenderness medial elbow     Repeated (L) elbow flexion with supin 1/10     (L) Elbow in flexion eccentric supination with a cane with 3 lbs 10 reps x 10 eccen ct      - no change test motion     (L) Elbow supination with extension 2 x 10 reps; P, NW      - still 1/10 with more flexion motion      + 10 more (L) elbow supi motion     (L) Elbow flexion concentric/eccentric with double black 10 sets x 5 reps x 10 eccen ct on last rep     (L) Elbow eccentric supination with cane and 3 lbs 2 x 10 reps x 10 eccen ct ea; NE      - no change test motion     (L) wrist roller with 3 extension with supination x 10 reps; NE     Seated: (L) elbow flexion concentric/eccentric with  double blackTB 10 sets x 5 reps x 10 eccen ct ea     (L) Elbow eccentric + rhytmic hold supination with cane and 3 lbs 10 reps x 10 eccen ct ea; NE     (B) UE ea; NE     Ballistic (L) elbow extension with supination x 10 reps; NE Scifit UE only L10 x 5 mins ea fwd/bkwd        Seated: (L) elbow flexion concentric/eccentric with  double blackTB 10 sets x 10 reps x 10 eccen ct ea     Standing: (L) elbow extension c ea fwd/bkwd    Standing: (L) elbow flexion concentric/eccentric with  double blackTB 10 sets x 10 reps x 10 eccen ct ea    Standing: (L) elbow extension concentric/eccentric load with double blackTB 10 sets x 10 reps ex 10 eccen ct ea    (L) functional bic work, home, and recreational activities without discomfort or deviation.             Plan:   Continue with directional preference exercise, eccentric strengthening, stability exercises, and patient education.      Charges:  TherEx x 3                   Tota

## 2021-01-15 ENCOUNTER — APPOINTMENT (OUTPATIENT)
Dept: PHYSICAL THERAPY | Age: 56
End: 2021-01-15
Attending: ORTHOPAEDIC SURGERY
Payer: OTHER MISCELLANEOUS

## 2023-05-22 NOTE — TELEPHONE ENCOUNTER
Jacek Flynn did not show for his appointment today at [address] [place] [address].  He communicated that he forgot he had an appointment today and that he was sorry for missing it.   He confirmed his next appointment on 10/9/2020 @ 7 am. Melolabial Transposition Flap Text: The defect edges were debeveled with a #15 scalpel blade. Given the location of the defect and the proximity to free margins a melolabial flap was deemed most appropriate. Using a sterile surgical marker, an appropriate melolabial transposition flap was drawn incorporating the defect. The area thus outlined was incised deep to adipose tissue with a #15 scalpel blade. The skin margins were undermined to an appropriate distance in all directions utilizing iris scissors. Following this, the designed flap was carried over into the primary defect and sutured into place.

## (undated) NOTE — LETTER
Patient Name: Ankush Soto  YOB: 1965          MRN :  [id number]  Date: 7/23/2020     Dx:  Left elbow pain (M25.522)         Authorized # of Visits:  12       Referring MD: Ena Martino MD  Next MD visit: 2-3 weeks from (7/23/2020)     Medi activities without discomfort or deviation. Plan:   Re-assess next session and continue with directional preference exercise, eccentric strengthening, stability exercises, and patient education.

## (undated) NOTE — LETTER
Patient Name: Ramos Schneider  YOB: 1965          MRN :  [id number]  Date: 8/13/2020     Dx:  Left elbow pain (M25.522)         Authorized # of Visits:  12       Referring MD: Ivan Marcos MD  Next MD visit: 2-3 weeks from (7/23/2020)     Medi

## (undated) NOTE — LETTER
Patient Name: Keyla Mota  YOB: 1965          MRN number:  [id number]     UPPER EXTREMITY EVALUATION:   Referring Physician: Dr. Florinda Lynch  Diagnosis:  Left elbow pain (M25.522)     Date of Onset: 5/26/2020 Date of Service: 6/10/2020     RAJNI Precautions: None     OBJECTIVE:   Observation/Posture: (L) Elbow extension lag; painful and guarded (L) UE movements; avoidance or weight bearing on the (L) UE; (+) swelling around jointline; (-) bruising.     Cervical Screen:  Limited CROM toward (R/L) ro visits over a 90 day period. Treatment will include: Directional preference exercises; Therapeutic Exercises; Neuromuscular Re-education; Electrical Stim; Patient education; Home exercise program instruction.     Education or treatment limitation: None  Suhas